# Patient Record
Sex: FEMALE | Race: WHITE
[De-identification: names, ages, dates, MRNs, and addresses within clinical notes are randomized per-mention and may not be internally consistent; named-entity substitution may affect disease eponyms.]

---

## 2018-04-29 ENCOUNTER — HOSPITAL ENCOUNTER (EMERGENCY)
Dept: HOSPITAL 25 - UCCORT | Age: 38
Discharge: HOME | End: 2018-04-29
Payer: COMMERCIAL

## 2018-04-29 VITALS — SYSTOLIC BLOOD PRESSURE: 117 MMHG | DIASTOLIC BLOOD PRESSURE: 75 MMHG

## 2018-04-29 DIAGNOSIS — R50.9: ICD-10-CM

## 2018-04-29 DIAGNOSIS — J02.9: Primary | ICD-10-CM

## 2018-04-29 DIAGNOSIS — K13.79: ICD-10-CM

## 2018-04-29 PROCEDURE — G0463 HOSPITAL OUTPT CLINIC VISIT: HCPCS

## 2018-04-29 PROCEDURE — 80053 COMPREHEN METABOLIC PANEL: CPT

## 2018-04-29 PROCEDURE — 86664 EPSTEIN-BARR NUCLEAR ANTIGEN: CPT

## 2018-04-29 PROCEDURE — 87502 INFLUENZA DNA AMP PROBE: CPT

## 2018-04-29 PROCEDURE — 87651 STREP A DNA AMP PROBE: CPT

## 2018-04-29 PROCEDURE — 99212 OFFICE O/P EST SF 10 MIN: CPT

## 2018-04-29 PROCEDURE — 86140 C-REACTIVE PROTEIN: CPT

## 2018-04-29 PROCEDURE — 36415 COLL VENOUS BLD VENIPUNCTURE: CPT

## 2018-04-29 PROCEDURE — 86665 EPSTEIN-BARR CAPSID VCA: CPT

## 2018-04-29 PROCEDURE — 86308 HETEROPHILE ANTIBODY SCREEN: CPT

## 2018-04-29 PROCEDURE — 85025 COMPLETE CBC W/AUTO DIFF WBC: CPT

## 2018-04-29 NOTE — UC
General HPI





- HPI Summary


HPI Summary: 





39 yo female c/o sore throat, fever, approx 1 day.  Noticed white spots on 

throat this morning.  No sob / cp / new palpitations.  No GI issues reported.  

No urinary issues. No rash.  + exposure household to strep throat.  Also + 

exposure to HFM virus, household.  





- History of Current Complaint


Chief Complaint: UCGeneralIllness


Stated Complaint: SORE THROAT


Time Seen by Provider: 04/29/18 16:25


Hx Obtained From: Patient


Hx Last Menstrual Period: 04/22/18


Pain Intensity: 3





- Allergy/Home Medications


Allergies/Adverse Reactions: 


 Allergies











Allergy/AdvReac Type Severity Reaction Status Date / Time


 


No Known Allergies Allergy   Verified 04/29/18 16:15











Home Medications: 


 Home Medications





Metoprolol Succinate XL TAB* [Toprol XL TAB*] 50 mg PO DAILY 04/29/18 [History 

Confirmed 04/29/18]


Warfarin TAB(*) [Coumadin TAB(*)] 5 mg PO DAILY 04/29/18 [History Confirmed 04/ 29/18]











PMH/Surg Hx/FS Hx/Imm Hx


Previously Healthy: Yes - does have hx atrial fibrillation, takes coumadin





- Surgical History


Surgical History: Yes


Surgery Procedure, Year, and Place: Repair ASD age 4,ACL RECONSTRUCTION RT KNEE





- Family History


Known Family History: Positive: Unknown





- Social History


Alcohol Use: Rare


Substance Use Type: None


Smoking Status (MU): Never Smoked Tobacco





Review of Systems


Constitutional: Negative


Skin: Negative


Eyes: Negative


ENT: Sore Throat


Respiratory: Negative


Cardiovascular: Negative


Gastrointestinal: Negative


Genitourinary: Negative


Motor: Negative


Neurovascular: Negative


Musculoskeletal: Negative


Neurological: Negative


Psychological: Negative


Is Patient Immunocompromised?: No


All Other Systems Reviewed And Are Negative: Yes





Physical Exam


Triage Information Reviewed: Yes


Appearance: Well-Nourished - sitting up, conversing easily and appropriately


Vital Signs: 


 Initial Vital Signs











Temp  101 F   04/29/18 16:09


 


Pulse  110   04/29/18 16:09


 


Resp  28   04/29/18 16:09


 


BP  134/73   04/29/18 16:09


 


Pulse Ox  100   04/29/18 16:09











Eye Exam: Normal - grossly normal


ENT: Positive: Pharyngeal erythema - post pharynx (tonsil and soft palate) + 

sores, no exudates appreciated however she has been chewing gum.  Uvula 

midline.  No airway obst or stridor.  Tongue / hard palate / cheeks grossly ok.

, Nasal drainage, TM dull


Neck exam: Normal


Neck: Positive: Supple, Nontender, No Lymphadenopathy


Respiratory Exam: Normal


Respiratory: Positive: Chest non-tender, Lungs clear, Normal breath sounds, No 

respiratory distress


Cardiovascular Exam: Normal


Cardiovascular: Positive: RRR, No Murmur, Pulses Normal, Brisk Capillary Refill


Abdominal Exam: Normal


Abdomen Description: Positive: Nontender


Musculoskeletal Exam: Normal


Neurological Exam: Normal


Psychological Exam: Normal


Skin Exam: Normal





Course/Dx





- Course


Course Of Treatment: Reviewed results with Ms. Jade.  Questions answered to 

the best of my ability.  At first, reluctance to treat fever with acetaminophen 

(she takes coumadin, and is scheduled for INR in 2 days).  However, temperature 

increased to 102.5, as such she was ok with 650m acetaminophen po.  Will advise 

her doctor of her clinical condition, also acetaminophen.   Will call doctor 

tomorrow.  Likely coxcackie variant.  Will check monospot / ebv (if neg monosp)

.  Blood work ordered, see orders.  Questions as posed answered to the best of 

my ability.  Declines work note.





- Differential Dx - Multi-Symptom


Provider Diagnoses: Acute pharyngitis with sores.  Febrile illness





Discharge





- Sign-Out/Discharge


Documenting (check all that apply): Discharge/Admit/Transfer





- Discharge Plan


Condition: Stable


Disposition: HOME


Patient Education Materials:  Pharyngitis (ED), Fever in Adults (ED)


Referrals: 


Lina Hernandez MD [Primary Care Provider] - 


Additional Instructions: 


Strep throat test today negative. 


Influenza test negative. 





Consider Coxcackie virus variant (such as, albeit not necessarily specific for, 

Hand Foot Mouth).





Drink plenty of fluids. 





Recommend treating fever if > 101.5F. 





Call your doctor tomorrow to schedule follow up appoinment for this week. 





Please seek medical attention for worse or new problems in the meantime. 





 





- Billing Disposition and Condition


Condition: STABLE


Disposition: HOME

## 2018-04-30 LAB
BASOPHILS # BLD AUTO: 0.1 10^3/UL (ref 0–0.2)
EOSINOPHIL # BLD AUTO: 0 10^3/UL (ref 0–0.6)
HCT VFR BLD AUTO: 40 % (ref 35–47)
HGB BLD-MCNC: 14 G/DL (ref 12–16)
LYMPHOCYTES # BLD AUTO: 0.7 10^3/UL (ref 1–4.8)
MCH RBC QN AUTO: 32 PG (ref 27–31)
MCHC RBC AUTO-ENTMCNC: 35 G/DL (ref 31–36)
MCV RBC AUTO: 90 FL (ref 80–97)
MONOCYTES # BLD AUTO: 0.4 10^3/UL (ref 0–0.8)
NEUTROPHILS # BLD AUTO: 9.2 10^3/UL (ref 1.5–7.7)
NRBC # BLD AUTO: 0 10^3/UL
NRBC BLD QL AUTO: 0.1
PLATELET # BLD AUTO: 255 10^3/UL (ref 150–450)
RBC # BLD AUTO: 4.4 10^6/UL (ref 4–5.4)
WBC # BLD AUTO: 10.5 10^3/UL (ref 3.5–10.8)

## 2018-05-01 NOTE — UC
- Progress Note


Progress Note: 





RN to call pt.  CRP elevated (not unexpected), c/w current illness.  Monospot 

negative. 


Important that she follows up with PCP as recommended. 


Seek medical treatment for worse or new problems in the meantime. 





Discharge





- Sign-Out/Discharge


Documenting (check all that apply): Post-Discharge Follow Up





- Discharge Plan


Condition: Stable


Disposition: HOME


Patient Education Materials:  Pharyngitis (ED), Fever in Adults (ED)


Referrals: 


Lina Hernandez MD [Primary Care Provider] - 


Additional Instructions: 


Strep throat test today negative. 


Influenza test negative. 





Consider Coxcackie virus variant (such as, albeit not necessarily specific for, 

Hand Foot Mouth).





Drink plenty of fluids. 





Recommend treating fever if > 101.5F. 





Call your doctor tomorrow to schedule follow up appoinment for this week. 





Please seek medical attention for worse or new problems in the meantime. 





 





- Billing Disposition and Condition


Condition: STABLE


Disposition: HOME